# Patient Record
Sex: MALE | Race: WHITE | Employment: OTHER | URBAN - NONMETROPOLITAN AREA
[De-identification: names, ages, dates, MRNs, and addresses within clinical notes are randomized per-mention and may not be internally consistent; named-entity substitution may affect disease eponyms.]

---

## 2024-09-18 PROBLEM — N52.9 ERECTILE DYSFUNCTION: Status: ACTIVE | Noted: 2024-09-18

## 2024-09-18 PROBLEM — N40.1 BENIGN PROSTATIC HYPERPLASIA WITH LOWER URINARY TRACT SYMPTOMS: Status: ACTIVE | Noted: 2024-09-18

## 2024-09-18 PROBLEM — I86.1 VARICOCELE: Status: ACTIVE | Noted: 2024-09-18

## 2024-09-18 PROBLEM — R35.1 NOCTURIA: Status: ACTIVE | Noted: 2024-09-18

## 2024-09-18 NOTE — PROGRESS NOTES
UROLOGY PROGRESS NOTE         NAME: Eduin Mcnally  AGE: 69 y.o. SEX: male  : 1955   MRN: [id number]    DATE: 2024  TIME: 8:10 AM    Assessment and Plan   Procedures     Impression:   1. Nocturia  2. Benign prostatic hyperplasia with lower urinary tract symptoms, symptom details unspecified  3. Varicocele  4. Erectile dysfunction, unspecified erectile dysfunction type       Plan: Patient with BPH with occasional variable flow, occasional urgency without urge incontinence some hesitancy.    Options include staying where he is with the tadalafil with the doxazosin although I do not think he benefits from having both of those.    My recommendation would be to consider taking finasteride to shrink the gland with as needed tadalafil for ED or consider minimally invasive surgical option.    Will also get PSA today.    Regarding BPH patient elects finasteride 5 mg a day plus daily tadalafil and in 6 months if doing well could go to as needed on the tadalafil.  Will also check a PSA in 6 months as well.      Chief Complaint   No chief complaint on file.    History of Present Illness     HPI: Eduin Mcnally is a 69 y.o. year old male who presents with valuation of BPH.  Per telephone call 9/15/2024 with guys agree patient on 5 mg daily tadalafil.    Apparently pharmacy when unable to authorize close patient currently also on doxazosin.    Office visit with [facility] urology 2023, patient with ongoing BPH, currently taking doxazosin 4 mg and 5 mg tadalafil and state he was voiding well.  PSA at that time was 1.9.  Had a vasectomy in .  PSAs have been normal between 1.2 and 1.6 over the last several years.  Patient's PSA 2023 was 2.2.    Patient saw me at [facility] urology 2022 for history of BPH, erectile dysfunction and left varicocele.    Most recent PSA 2023 was 2.2.    Regarding erectile dysfunction patient lately is has been having some issues with the 5 mg dose.  He does have  urine symptoms variability and flow urge without urge incontinence hesitancy at times.  Varicoceles but nonbothersome.  Discussed his recent PSA and the need to consider PSA this year.  Also discussed the importance of a FREEDOM.  The following portions of the patient's history were reviewed and updated as appropriate: allergies, current medications, past family history, past medical history, past social history, past surgical history and problem list.  No past medical history on file.  No past surgical history on file.  shoulder  Review of Systems     Const: Denies chills, fever and weight loss.  CV: Denies chest pain.  Resp: Denies SOB.  GI: Denies abdominal pain, nausea and vomiting.  : Denies symptoms other than stated above.  Musculo: Denies back pain.    Objective   There were no vitals taken for this visit.    Physical Exam  Const: Appears healthy and well developed. No signs of acute distress present.  Resp: Respirations are regular and unlabored.   CV: Rate is regular. Rhythm is regular.  Abdomen: Abdomen is soft, nontender, and nondistended. Kidneys are not palpable.  : Normal external detail exam he had a stable left varicocele moderate size.  Prostate enlarged smooth no masses or nodules  Psych: Patient's attitude is cooperative. Mood is normal. Affect is normal.    Current Medications   No current outpatient medications on file.        Isaias Lyman MD

## 2024-09-24 RX ORDER — DOXAZOSIN 4 MG/1
TABLET ORAL
COMMUNITY
Start: 2019-06-23

## 2024-09-24 RX ORDER — CLINDAMYCIN PHOSPHATE 10 MG/G
GEL TOPICAL
COMMUNITY
Start: 2024-08-08

## 2024-09-24 RX ORDER — CHLORAL HYDRATE 500 MG
CAPSULE ORAL
COMMUNITY

## 2024-09-24 RX ORDER — VIT A/VIT C/VIT E/ZINC/COPPER 7160-113
1 TABLET, DELAYED RELEASE (ENTERIC COATED) ORAL DAILY
COMMUNITY
End: 2024-09-27 | Stop reason: ALTCHOICE

## 2024-09-24 RX ORDER — HYDROCORTISONE 25 MG/G
CREAM TOPICAL
COMMUNITY
Start: 2024-06-27

## 2024-09-24 RX ORDER — TADALAFIL 5 MG/1
5 TABLET ORAL DAILY
COMMUNITY
Start: 2019-06-23

## 2024-09-24 RX ORDER — ROSUVASTATIN CALCIUM 20 MG/1
20 TABLET, COATED ORAL
COMMUNITY
Start: 2024-04-08

## 2024-09-24 RX ORDER — UBIDECARENONE 200 MG
200 CAPSULE ORAL
COMMUNITY

## 2024-09-27 ENCOUNTER — OFFICE VISIT (OUTPATIENT)
Dept: UROLOGY | Facility: CLINIC | Age: 69
End: 2024-09-27
Payer: MEDICARE

## 2024-09-27 ENCOUNTER — APPOINTMENT (OUTPATIENT)
Dept: LAB | Facility: HOSPITAL | Age: 69
End: 2024-09-27
Payer: MEDICARE

## 2024-09-27 VITALS
WEIGHT: 193.4 LBS | TEMPERATURE: 98.6 F | DIASTOLIC BLOOD PRESSURE: 82 MMHG | HEIGHT: 69 IN | SYSTOLIC BLOOD PRESSURE: 150 MMHG | OXYGEN SATURATION: 98 % | HEART RATE: 64 BPM | BODY MASS INDEX: 28.64 KG/M2

## 2024-09-27 DIAGNOSIS — N40.1 BENIGN PROSTATIC HYPERPLASIA WITH LOWER URINARY TRACT SYMPTOMS, SYMPTOM DETAILS UNSPECIFIED: ICD-10-CM

## 2024-09-27 DIAGNOSIS — I86.1 VARICOCELE: ICD-10-CM

## 2024-09-27 DIAGNOSIS — R35.1 NOCTURIA: Primary | ICD-10-CM

## 2024-09-27 DIAGNOSIS — R35.1 NOCTURIA: ICD-10-CM

## 2024-09-27 DIAGNOSIS — N52.9 ERECTILE DYSFUNCTION, UNSPECIFIED ERECTILE DYSFUNCTION TYPE: ICD-10-CM

## 2024-09-27 LAB
POST-VOID RESIDUAL VOLUME, ML POC: 6 ML
PSA SERPL-MCNC: 2.97 NG/ML (ref 0–4)
SL AMB  POCT GLUCOSE, UA: NORMAL
SL AMB LEUKOCYTE ESTERASE,UA: NORMAL
SL AMB POCT BILIRUBIN,UA: NORMAL
SL AMB POCT BLOOD,UA: NORMAL
SL AMB POCT CLARITY,UA: CLEAR
SL AMB POCT COLOR,UA: YELLOW
SL AMB POCT KETONES,UA: NORMAL
SL AMB POCT NITRITE,UA: NORMAL
SL AMB POCT PH,UA: 6.5
SL AMB POCT SPECIFIC GRAVITY,UA: 1.02
SL AMB POCT URINE PROTEIN: NORMAL
SL AMB POCT UROBILINOGEN: 0.2

## 2024-09-27 PROCEDURE — 81003 URINALYSIS AUTO W/O SCOPE: CPT | Performed by: UROLOGY

## 2024-09-27 PROCEDURE — 36415 COLL VENOUS BLD VENIPUNCTURE: CPT

## 2024-09-27 PROCEDURE — 51798 US URINE CAPACITY MEASURE: CPT | Performed by: UROLOGY

## 2024-09-27 PROCEDURE — 84153 ASSAY OF PSA TOTAL: CPT

## 2024-09-27 PROCEDURE — 99214 OFFICE O/P EST MOD 30 MIN: CPT | Performed by: UROLOGY

## 2024-09-27 RX ORDER — FINASTERIDE 5 MG/1
5 TABLET, FILM COATED ORAL DAILY
Qty: 90 TABLET | Refills: 3 | Status: SHIPPED | OUTPATIENT
Start: 2024-09-27

## 2024-09-27 NOTE — PATIENT INSTRUCTIONS
Start the finasteride also called Proscar once a day to shrink the prostate.  Side effects are decreased libido, and nipple tenderness less than 10% of people.    Please continue the daily tadalafil.  If you feel during times of relations you need a higher dose okay to take up to 20 mg.  If you would take the 10 or the 20 mg dose then do not take the daily dose at the 5 mg for 2 days.

## 2025-03-19 ENCOUNTER — TELEPHONE (OUTPATIENT)
Dept: UROLOGY | Facility: CLINIC | Age: 70
End: 2025-03-19

## 2025-03-19 NOTE — TELEPHONE ENCOUNTER
Left message for pt to call office back to complete psa testing for upcoming appt. Order in. Please find out where pt would like to have testing done and fax if going other then [facility].

## 2025-03-19 NOTE — TELEPHONE ENCOUNTER
Patient called back and said he has PSA done and will have office fax us a copy of the report. Gave him our fax number [phone]

## 2025-03-25 ENCOUNTER — TELEPHONE (OUTPATIENT)
Age: 70
End: 2025-03-25

## 2025-03-25 NOTE — PROGRESS NOTES
UROLOGY PROGRESS NOTE         NAME: Eduin Mcnally  AGE: 69 y.o. SEX: male  : 1955   MRN: [id number]    DATE: 3/25/2025  TIME: 4:14 PM    Assessment and Plan   Procedures     Impression:   1. Benign prostatic hyperplasia with lower urinary tract symptoms, symptom details unspecified  2. Varicocele  3. Nocturia  4. Erectile dysfunction, unspecified erectile dysfunction type         Plan: We are going to go ahead and stop the finasteride for 4 weeks.  Patient will call and if anxiety has improved, patient should reach out to PCP regarding the need for Prozac.    From urology perspective we could consider switching to Avodart.  Patient states he will call me in about 4 weeks with how he is doing.    Patient likely will follow-up with me in 1 year with a PSA 1 week prior to appointment.      Chief Complaint   No chief complaint on file.    History of Present Illness     HPI: Eduin Mcnally is a 69 y.o. year old male who presents with 6-month follow-up office visit from 2024.  Patient with a history of BPH, ED, nocturia and varicocele.    At that time we decided to use finasteride 5 mg a day and use tadalafil as needed for ED.  He was to come back in 6 months to check response as well as a PSA.  Of note, PSA 2024 was stable at 2.9.  On review of medication patient also on Cardura.  Will need to check events for blood pressure or BPH.  His PSA on 3/14/2025 was 1.47    Reviewed PSA patient was pleased.  He thinks the first month and a half on the finasteride he was doing great and then he had some anxiety and depression issues and saw his PCP and she placed him on Prozac.  Patient wanted to know if he could take a drug holiday from the finasteride and I think it is a good idea.    He only takes tadalafil as needed for ED and that works.        The following portions of the patient's history were reviewed and updated as appropriate: allergies, current medications, past family history, past medical  history, past social history, past surgical history and problem list.  Past Medical History:   Diagnosis Date    Anxiety     Benign neoplasm of colon     BPH (benign prostatic hypertrophy)     Depression with anxiety     Dyslipidemia      Past Surgical History:   Procedure Laterality Date    APPENDECTOMY      APPENDECTOMY      COLONOSCOPY      [facility] [place]oscopy. hyperplastic. [place] [facility] aden[place]    KNEE ARTHROSCOPY      NASAL SEPTUM SURGERY      OTHER SURGICAL HISTORY      Removal of turbinate bones    SHOULDER ARTHROSCOPY      TONSILECTOMY AND ADNOIDECTOMY      VASECTOMY      WRIST ARTHROSCOPY       shoulder  Review of Systems     Const: Denies chills, fever and weight loss.  CV: Denies chest pain.  Resp: Denies SOB.  GI: Denies abdominal pain, nausea and vomiting.  : Denies symptoms other than stated above.  Musculo: Denies back pain.    Objective   There were no vitals taken for this visit.    Physical Exam  Const: Appears healthy and well developed. No signs of acute distress present.  Resp: Respirations are regular and unlabored.   CV: Rate is regular. Rhythm is regular.  Abdomen: Abdomen is soft, nontender, and nondistended. Kidneys are not palpable.  : Normal external detail exam no breast mass or tenderness prostate enlarged smooth no masses or nodules  Psych: Patient's attitude is cooperative. Mood is normal. Affect is normal.    Current Medications     Current Outpatient Medications:     Cholecalciferol 125 MCG (5000 UT) capsule, Take 5,000 Units by mouth, Disp: , Rfl:     clindamycin 1 % gel, Apply to scalp twice daily on active areas as needed, Disp: , Rfl:     Coenzyme Q10 200 MG capsule, Take 200 mg by mouth, Disp: , Rfl:     doxazosin (CARDURA) 4 mg tablet, , Disp: , Rfl:     finasteride (PROSCAR) 5 mg tablet, Take 1 tablet (5 mg total) by mouth daily, Disp: 90 tablet, Rfl: 3    hydrocortisone (ANUSOL-HC) 2.5 % rectal cream, Insert into the rectum, Disp: , Rfl:     Multiple Vitamins-Minerals  (Centrum Silver 50+Men) TABS, Take by mouth, Disp: , Rfl:     Omega-3 Fatty Acids (fish oil) 1,000 mg, Take by mouth, Disp: , Rfl:     rosuvastatin (CRESTOR) 20 MG tablet, Take 20 mg by mouth, Disp: , Rfl:     tadalafil (CIALIS) 5 MG tablet, Take 5 mg by mouth daily, Disp: , Rfl:         Isaias Lyman MD

## 2025-03-25 NOTE — TELEPHONE ENCOUNTER
Spoke with pt wife they will keep appt for tomorrow. Offered next appt on April 11, states they will make it work for tomorrow.

## 2025-03-25 NOTE — TELEPHONE ENCOUNTER
Wife of the patient was calling in regards to her 's appt tomorrow.     She was first looking to see if there was anything available earlier in the morning or next week. Was able to move up the appt to 10:45 am on the same day.     But she is still concerned about travel time because it is about 1 hr 45 mins from their home to the appt.     The concern is the fact that the patient's mother passed away. The patient is trying to keep the appt because he really needs to talk to Dr. Lyman about his medications and some concerns the patient has. But the viewing for his mother is late afternoon/early evening tomorrow. So, the concern is with the travel time.     Offered tomorrow appt. That did not work either because that is the .     So, to save the travel time but still have the appt with the provider they were wondering if they can do a Telemed phone call (they are not sure how to use the [employer] for a Video Visit)    Can this please be reviewed with the provider because of the extenuating circumstances?    Wife would appreciate a call back on her phone. Since her  may be busy if the office calls back.    [phone]

## 2025-03-26 ENCOUNTER — OFFICE VISIT (OUTPATIENT)
Dept: UROLOGY | Facility: CLINIC | Age: 70
End: 2025-03-26
Payer: MEDICARE

## 2025-03-26 VITALS
BODY MASS INDEX: 26.87 KG/M2 | OXYGEN SATURATION: 98 % | TEMPERATURE: 98.3 F | HEART RATE: 42 BPM | SYSTOLIC BLOOD PRESSURE: 146 MMHG | HEIGHT: 69 IN | DIASTOLIC BLOOD PRESSURE: 78 MMHG | WEIGHT: 181.4 LBS

## 2025-03-26 DIAGNOSIS — I86.1 VARICOCELE: ICD-10-CM

## 2025-03-26 DIAGNOSIS — N52.9 ERECTILE DYSFUNCTION, UNSPECIFIED ERECTILE DYSFUNCTION TYPE: ICD-10-CM

## 2025-03-26 DIAGNOSIS — R35.1 NOCTURIA: ICD-10-CM

## 2025-03-26 DIAGNOSIS — N40.1 BENIGN PROSTATIC HYPERPLASIA WITH LOWER URINARY TRACT SYMPTOMS, SYMPTOM DETAILS UNSPECIFIED: Primary | ICD-10-CM

## 2025-03-26 PROCEDURE — 99214 OFFICE O/P EST MOD 30 MIN: CPT | Performed by: UROLOGY

## 2025-03-26 NOTE — PATIENT INSTRUCTIONS
Please call Dr. Lyman's office at [phone] or send email or message in 4 weeks to see how patient is doing off the finasteride.

## 2025-05-01 DIAGNOSIS — N40.1 BENIGN PROSTATIC HYPERPLASIA WITH LOWER URINARY TRACT SYMPTOMS, SYMPTOM DETAILS UNSPECIFIED: ICD-10-CM

## 2025-05-01 NOTE — TELEPHONE ENCOUNTER
Patient called today to say that he'd like to go back on the Finasteride 5 mg. Pt states he's not sure if it was the Rx that affected his anxiety. But will like to get back on it and if notices the anxiety returning, pt will know that it's due to the finasteride.     [id number] - [place] - [address] [place] [address] [phone]

## 2025-05-02 RX ORDER — FINASTERIDE 5 MG/1
5 TABLET, FILM COATED ORAL DAILY
Qty: 90 TABLET | Refills: 3 | OUTPATIENT
Start: 2025-05-02

## 2025-05-02 NOTE — TELEPHONE ENCOUNTER
Left message for patient to see if he would like to resume the finasteride or try avodart. If patient calls back please let us know what he decides

## 2025-05-02 NOTE — TELEPHONE ENCOUNTER
Please let patient know that dr foster made a note that the patient could consider avodart if he were to resume this medication.    Please ask patient which he prefers

## 2025-05-06 RX ORDER — FINASTERIDE 5 MG/1
5 TABLET, FILM COATED ORAL DAILY
Qty: 90 TABLET | Refills: 3 | Status: SHIPPED | OUTPATIENT
Start: 2025-05-06